# Patient Record
Sex: MALE
[De-identification: names, ages, dates, MRNs, and addresses within clinical notes are randomized per-mention and may not be internally consistent; named-entity substitution may affect disease eponyms.]

---

## 2023-02-03 PROBLEM — Z00.00 ENCOUNTER FOR PREVENTIVE HEALTH EXAMINATION: Status: ACTIVE | Noted: 2023-02-03

## 2023-02-08 ENCOUNTER — APPOINTMENT (OUTPATIENT)
Dept: PEDIATRIC SURGERY | Facility: CLINIC | Age: 23
End: 2023-02-08

## 2023-03-31 ENCOUNTER — APPOINTMENT (OUTPATIENT)
Dept: PEDIATRIC SURGERY | Facility: CLINIC | Age: 23
End: 2023-03-31
Payer: COMMERCIAL

## 2023-03-31 VITALS
HEIGHT: 72.83 IN | TEMPERATURE: 98 F | OXYGEN SATURATION: 99 % | WEIGHT: 149.47 LBS | SYSTOLIC BLOOD PRESSURE: 128 MMHG | BODY MASS INDEX: 19.81 KG/M2 | DIASTOLIC BLOOD PRESSURE: 77 MMHG | HEART RATE: 69 BPM

## 2023-03-31 PROCEDURE — 99205 OFFICE O/P NEW HI 60 MIN: CPT

## 2023-03-31 NOTE — PHYSICAL EXAM
[NL] : grossly intact [FreeTextEntry4] : There is no chest wall abnormality.  The sternum is flat.  There is palpable tenderness along the left costal margin, but no palpable click or rib movement.  A hooking maneuver was not performed.

## 2023-03-31 NOTE — HISTORY OF PRESENT ILLNESS
[FreeTextEntry1] : Andrea is a 22 year old male here today to be evaluated for left sided chest pain. He first noticed the left chest pain in November. It started acutely, and was sharp and severe.  He stated that the pain lasted for a few hours. He describes the pain as within the chest wall. He denies pain in any other locations. The pain recurred intermittently, and began to occur more frequently with time.  He states that it is now almost constant.  It is aggravated by exercise and physical activity.  The pain has only minimally improved with NSAIDs and a course of corticosteroids. He has undergone an extensive medical workup looking for a cause for the pain, including an x-ray that was read as normal, an evaluation by a cardiologist and a pulmonologist.  PFTs, an echocardiogram and a chest CT have all been done and have been nondiagnostic.  He has no other significant medical problems. He has not had any fevers. He denies any infection. He has normal bowel movements without constipation. He has normal appetite. He has an allergy to penicillin. \par \par \par \par

## 2023-03-31 NOTE — ASSESSMENT
[FreeTextEntry1] : Andrea is a 22 year old male with left sided chest pain. On exam, there is no chest wall deformity visualized. 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  I explained to the parents and Andrea that SRS is quite rare, and that I have not actually seen it.  I told them that I would speak with Dr. Jeremiah Lam of thoracic surgery. If Dr. Lam feels he can evaluate Andrea, I would recommend that Andrea see him.  In the interim, I recommended Andrea take one pill of Aleve twice a day for short term relief. They have indicated their understanding. They have my information and know to contact me sooner with any questions or concerns.\par \par

## 2023-03-31 NOTE — ADDENDUM
[FreeTextEntry1] : Documented by Enrrique Matias acting as a scribe for  on 3/31/2023.\par \par All medical record entries made by the Scribe were at my, , direction and personally dictated by me on 3/31/2023. I have reviewed the chart and agree that the record accurately reflects my personal performances of the history, physical exam, assessment and plan. I have also personally directed, reviewed, and agree with the discharge instructions.\par

## 2023-03-31 NOTE — CONSULT LETTER
[Dear  ___] : Dear  [unfilled], [Consult Letter:] : I had the pleasure of evaluating your patient, [unfilled]. [Please see my note below.] : Please see my note below. [Consult Closing:] : Thank you very much for allowing me to participate in the care of this patient.  If you have any questions, please do not hesitate to contact me. [Sincerely,] : Sincerely, [FreeTextEntry2] : Kalpana Ochoa PCP [FreeTextEntry3] : Vitaliy Medina MD\par  for Perioperative Services\par Division of Pediatric General, Thoracic and Endoscopic Surgery\par [facility]\par \par

## 2023-03-31 NOTE — REASON FOR VISIT
[Initial - Scheduled] : an initial, scheduled visit with concerns of [Patient] : patient [Parents] : parents [FreeTextEntry3] : Chest pain\par  [FreeTextEntry4] : Kalpana Ochoa PCP

## 2023-04-12 ENCOUNTER — TRANSCRIPTION ENCOUNTER (OUTPATIENT)
Age: 23
End: 2023-04-12

## 2023-04-12 ENCOUNTER — APPOINTMENT (OUTPATIENT)
Dept: THORACIC SURGERY | Facility: CLINIC | Age: 23
End: 2023-04-12
Payer: COMMERCIAL

## 2023-04-12 ENCOUNTER — NON-APPOINTMENT (OUTPATIENT)
Age: 23
End: 2023-04-12

## 2023-04-12 VITALS
BODY MASS INDEX: 41.75 KG/M2 | WEIGHT: 315 LBS | HEART RATE: 73 BPM | DIASTOLIC BLOOD PRESSURE: 70 MMHG | OXYGEN SATURATION: 98 % | SYSTOLIC BLOOD PRESSURE: 107 MMHG | HEIGHT: 73 IN

## 2023-04-12 DIAGNOSIS — M95.4 ACQUIRED DEFORMITY OF CHEST AND RIB: ICD-10-CM

## 2023-04-12 PROCEDURE — 99205 OFFICE O/P NEW HI 60 MIN: CPT

## 2023-04-13 PROBLEM — M95.4 CHEST DEFORMITY: Status: ACTIVE | Noted: 2023-03-31

## 2023-04-13 RX ORDER — NAPROXEN 500 MG/1
500 TABLET ORAL
Refills: 0 | Status: ACTIVE | COMMUNITY

## 2023-04-14 NOTE — HISTORY OF PRESENT ILLNESS
[FreeTextEntry1] : Mr. CLARISSE PANDYA, 22 year old male, never a smoker, with no significant past medical hx, who presented with left sides chest pain since 11/2022, aggravated by exercise and physical activity, minimally improved with NSAIDs and a course of corticosteroids (to r/o costochondritis). He has undergone extensive work-up to evaluate cause for the pain, including an x-ray, an evaluation by a cardiologist and a pulmonologist. PFTs, an echocardiogram and a chest CT have all been done and have been nondiagnostic. \par \par Of Note: Parents report patient has been noted to have a small dip in his chest since the age of 14 years old and has been followed by Dr. 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 Vitaliy Medina ([facility] Sx) for possible slipping rib syndrome. Overall, he/she reports to be feeling well. Denies any chest pain, shortness of breath, cough, or hemoptysis. He does reports it hurts to take deep breaths in at times. Parents also report growth spurt since last year.

## 2023-04-14 NOTE — DATA REVIEWED
[FreeTextEntry1] : I have independently reviewed the following:\par CT Chest on 11/07/2022 \par CT Chest on 01/19/2023

## 2023-04-14 NOTE — CONSULT LETTER
[Dear  ___] : Dear  [unfilled], [Consult Letter:] : I had the pleasure of evaluating your patient, [unfilled]. [( Thank you for referring [unfilled] for consultation for _____ )] : Thank you for referring [unfilled] for consultation for [unfilled] [Please see my note below.] : Please see my note below. [Consult Closing:] : Thank you very much for allowing me to participate in the care of this patient.  If you have any questions, please do not hesitate to contact me. [Sincerely,] : Sincerely, [FreeTextEntry2] : Dr. Vitaliy Medina (Peds Sx/ Ref) [FreeTextEntry3] : Jeremiah Lam MD, FACS\par , Division of Thoracic Surgery\par [facility]\par Thoracic Surgery\par [facility]\par Department of Cardiovascular & Thoracic Surgery\par \par [facility] [employer] [facility]

## 2023-04-14 NOTE — ASSESSMENT
[FreeTextEntry1] : Mr. CLARISSE PANDYA, 22 year old male, never a smoker, with no significant past medical hx, who presented with left sides chest pain since 11/2022, aggravated by exercise and physical activity, minimally improved with NSAIDs and a course of corticosteroids. He has undergone extensive work-up to evaluate cause for the pain, including an x-ray, an evaluation by a cardiologist and a pulmonologist. PFTs, an echocardiogram and a chest CT have all been done and have been nondiagnostic.\par \par Of Note: Parents report patient has been noted to have a small dip in his chest since the age of 14 years old and has been followed by Dr. Kalpana Ochoa ([facility]).\par \par Patient is here today for CT Sx consultation, referred by Dr. Vitaliy Medina ([facility] Sx) for possible slipping rib syndrome. \par \par I have independently reviewed the medical records and imaging at the time of this office consultation. CT Chest reviewed with patient and parents revealing chest deformity. I discussed based on the findings, clement index is 2.6 indication of mild pectus deformity, which I think he may outgrow and is related to his growth spurt. I do not think surgical interventions are necessary at this time. I will discuss case with Dr. Medina, surgery vs. surveillance. In the meantime, I recommend he applied lidocaine patch to help alleviate pain symptoms as needed. Return to clinic 3-4 months for clinical follow up. Patient and family agreeable to above plan.\par \par Recommendations reviewed with patient during this office visit, and all questions answered; Patient instructed on the importance of follow up and verbalizes understanding. \par \par \par I, TONG Adair, personally performed the evaluation and management (E/M) services for this new patient.  That E/M includes conducting the initial examination, assessing all conditions, and establishing the plan of care.  Today, my ACP, Jose Perez NP, was here to observe my evaluation and management services for this patient to be followed going forward.

## 2023-04-14 NOTE — PHYSICAL EXAM
[Fully active, able to carry on all pre-disease performance without restriction] : Status 0 - Fully active, able to carry on all pre-disease performance without restriction [General Appearance - Alert] : alert [General Appearance - In No Acute Distress] : in no acute distress [Sclera] : the sclera and conjunctiva were normal [PERRL With Normal Accommodation] : pupils were equal in size, round, and reactive to light [Extraocular Movements] : extraocular movements were intact [Outer Ear] : the ears and nose were normal in appearance [Oropharynx] : the oropharynx was normal [Neck Appearance] : the appearance of the neck was normal [Neck Cervical Mass (___cm)] : no neck mass was observed [Jugular Venous Distention Increased] : there was no jugular-venous distention [Thyroid Diffuse Enlargement] : the thyroid was not enlarged [Thyroid Nodule] : there were no palpable thyroid nodules [Auscultation Breath Sounds / Voice Sounds] : lungs were clear to auscultation bilaterally [Heart Rate And Rhythm] : heart rate was normal and rhythm regular [Heart Sounds] : normal S1 and S2 [Heart Sounds Gallop] : no gallops [Murmurs] : no murmurs [Heart Sounds Pericardial Friction Rub] : no pericardial rub [Examination Of The Chest] : the chest was normal in appearance [Chest Visual Inspection Thoracic Asymmetry] : no chest asymmetry [Diminished Respiratory Excursion] : normal chest expansion [2+] : left 2+ [No Abnormalities] : the abdominal aorta was not enlarged and no bruit was heard [Breast Appearance] : normal in appearance [Bowel Sounds] : normal bowel sounds [Breast Palpation Mass] : no palpable masses [Abdomen Soft] : soft [Abdomen Tenderness] : non-tender [Abdomen Mass (___ Cm)] : no abdominal mass palpated [Cervical Lymph Nodes Enlarged Posterior Bilaterally] : posterior cervical [Cervical Lymph Nodes Enlarged Anterior Bilaterally] : anterior cervical [Supraclavicular Lymph Nodes Enlarged Bilaterally] : supraclavicular [Femoral Lymph Nodes Enlarged Bilaterally] : femoral [Axillary Lymph Nodes Enlarged Bilaterally] : axillary [Inguinal Lymph Nodes Enlarged Bilaterally] : inguinal [No CVA Tenderness] : no ~M costovertebral angle tenderness [No Spinal Tenderness] : no spinal tenderness [Abnormal Walk] : normal gait [Nail Clubbing] : no clubbing  or cyanosis of the fingernails [Musculoskeletal - Swelling] : no joint swelling seen [Motor Tone] : muscle strength and tone were normal [Skin Color & Pigmentation] : normal skin color and pigmentation [Skin Turgor] : normal skin turgor [] : no rash [Deep Tendon Reflexes (DTR)] : deep tendon reflexes were 2+ and symmetric [Sensation] : the sensory exam was normal to light touch and pinprick [No Focal Deficits] : no focal deficits [Oriented To Time, Place, And Person] : oriented to person, place, and time [Impaired Insight] : insight and judgment were intact [Affect] : the affect was normal [Right Carotid Bruit] : no bruit heard over the right carotid [Left Carotid Bruit] : no bruit heard over the left carotid [Right Femoral Bruit] : no bruit heard over the right femoral artery [Left Femoral Bruit] : no bruit heard over the left femoral artery [FreeTextEntry1] : deferred

## 2023-08-03 NOTE — ASSESSMENT
[FreeTextEntry1] : Mr. CLARISSE PANDYA, 23 year old male, never a smoker, with no significant past medical hx, who presented with left sides chest pain since 11/2022, aggravated by exercise and physical activity, minimally improved with NSAIDs and a course of corticosteroids. He has undergone extensive work-up to evaluate cause for the pain, including an x-ray, an evaluation by a cardiologist and a pulmonologist. PFTs, an echocardiogram and a chest CT have all been done and have been nondiagnostic.\par \par Of Note: Parents report patient has been noted to have a small dip in his chest since the age of 14 years old and has been followed by Dr. Kalpana Ochoa (Peds).\par \par Referred by Dr. Vitaliy Medina (Peds Sx) for possible slipping rib syndrome. \par \par Patient is here today for clinical follow up.\par \par I have independently reviewed the medical records and imaging at the time of this office consultation. \par \par Recommendations reviewed with patient during this office visit, and all questions answered; Patient instructed on the importance of follow up and verbalizes understanding. \par \par \par

## 2023-08-03 NOTE — CONSULT LETTER
[FreeTextEntry2] : Dr. Vitaliy Medina (Peds Sx/ Ref) [FreeTextEntry3] : Jeremiah Lam MD, FACS\par , Division of Thoracic Surgery\par [facility]\par Thoracic Surgery\par [facility]\par Department of Cardiovascular & Thoracic Surgery\par \par [facility]

## 2023-08-03 NOTE — HISTORY OF PRESENT ILLNESS
[FreeTextEntry1] : Mr. CLARISSE PANDYA, 23 year old male, never a smoker, with no significant past medical hx, who presented with left sides chest pain since 11/2022, aggravated by exercise and physical activity, minimally improved with NSAIDs and a course of corticosteroids (to r/o costochondritis). He has undergone extensive work-up to evaluate cause for the pain, including an x-ray, an evaluation by a cardiologist and a pulmonologist. PFTs, an echocardiogram and a chest CT have all been done and have been nondiagnostic. Referred by Dr. Vitaliy Medina ([facility] Sx) for possible slipping rib syndrome.\par \par Of Note: Parents report patient has been noted to have a small dip in his chest since the age of 14 years old and has been followed by Dr. 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 I do not think surgical interventions are necessary at this time. I will discuss case with Dr. Medina, surgery vs. surveillance. In the meantime, I recommend he applied lidocaine patch to help alleviate pain symptoms as needed. Return to clinic 3-4 months for clinical follow up. \par \par \par Patient is here today for follow up.

## 2023-08-09 ENCOUNTER — APPOINTMENT (OUTPATIENT)
Dept: THORACIC SURGERY | Facility: CLINIC | Age: 23
End: 2023-08-09

## 2023-08-09 ENCOUNTER — NON-APPOINTMENT (OUTPATIENT)
Age: 23
End: 2023-08-09